# Patient Record
Sex: FEMALE | Race: BLACK OR AFRICAN AMERICAN
[De-identification: names, ages, dates, MRNs, and addresses within clinical notes are randomized per-mention and may not be internally consistent; named-entity substitution may affect disease eponyms.]

---

## 2020-11-01 ENCOUNTER — HOSPITAL ENCOUNTER (EMERGENCY)
Dept: HOSPITAL 5 - ED | Age: 40
Discharge: HOME | End: 2020-11-01
Payer: COMMERCIAL

## 2020-11-01 DIAGNOSIS — Z3A.01: ICD-10-CM

## 2020-11-01 DIAGNOSIS — O03.9: Primary | ICD-10-CM

## 2020-11-01 LAB
ALBUMIN SERPL-MCNC: 3.9 G/DL (ref 3.9–5)
ALT SERPL-CCNC: 30 UNITS/L (ref 7–56)
BASOPHILS # (AUTO): 0.1 K/MM3 (ref 0–0.1)
BASOPHILS NFR BLD AUTO: 0.8 % (ref 0–1.8)
BILIRUB UR QL STRIP: (no result)
BLOOD UR QL VISUAL: (no result)
BUN SERPL-MCNC: 14 MG/DL (ref 7–17)
BUN/CREAT SERPL: 18 %
CALCIUM SERPL-MCNC: 9 MG/DL (ref 8.4–10.2)
EOSINOPHIL # BLD AUTO: 0.2 K/MM3 (ref 0–0.4)
EOSINOPHIL NFR BLD AUTO: 2.8 % (ref 0–4.3)
HCT VFR BLD CALC: 38.1 % (ref 30.3–42.9)
HEMOLYSIS INDEX: 12
HGB BLD-MCNC: 12.9 GM/DL (ref 10.1–14.3)
LYMPHOCYTES # BLD AUTO: 1.6 K/MM3 (ref 1.2–5.4)
LYMPHOCYTES NFR BLD AUTO: 25 % (ref 13.4–35)
MCHC RBC AUTO-ENTMCNC: 34 % (ref 30–34)
MCV RBC AUTO: 99 FL (ref 79–97)
MONOCYTES # (AUTO): 0.5 K/MM3 (ref 0–0.8)
MONOCYTES % (AUTO): 7.4 % (ref 0–7.3)
MUCOUS THREADS #/AREA URNS HPF: (no result) /HPF
PH UR STRIP: 6 [PH] (ref 5–7)
PLATELET # BLD: 274 K/MM3 (ref 140–440)
PROT UR STRIP-MCNC: (no result) MG/DL
RBC # BLD AUTO: 3.84 M/MM3 (ref 3.65–5.03)
RBC #/AREA URNS HPF: < 1 /HPF (ref 0–6)
UROBILINOGEN UR-MCNC: < 2 MG/DL (ref ?–2)
WBC #/AREA URNS HPF: 5 /HPF (ref 0–6)

## 2020-11-01 PROCEDURE — 80053 COMPREHEN METABOLIC PANEL: CPT

## 2020-11-01 PROCEDURE — 81001 URINALYSIS AUTO W/SCOPE: CPT

## 2020-11-01 PROCEDURE — 76856 US EXAM PELVIC COMPLETE: CPT

## 2020-11-01 PROCEDURE — 85025 COMPLETE CBC W/AUTO DIFF WBC: CPT

## 2020-11-01 PROCEDURE — 83690 ASSAY OF LIPASE: CPT

## 2020-11-01 PROCEDURE — 84702 CHORIONIC GONADOTROPIN TEST: CPT

## 2020-11-01 PROCEDURE — 36415 COLL VENOUS BLD VENIPUNCTURE: CPT

## 2020-11-01 NOTE — EMERGENCY DEPARTMENT REPORT
Blank Doc





- Documentation


Documentation: 





40-year-old female that presents with abdominal pain.  Stated had a mischarge 6 

days ago.  Denies any vaginal bleeding








Translation phone line used for interruption.





This initial assessment/diagnostic orders/clinical plan/treatment(s) is/are 

subject to change based on patient's health status, clinical progression and re-

assessment by fellow clinical providers in the ED.  Further treatment and workup

at subsequent clinical providers discretion.  Patient/guardians urged not to 

elope from the ED as their condition may be serious if not clinically assessed 

and managed.  Initial orders include:


1- Patient sent to [facility] for further evaluation and treatment


2- labs


3- UA

## 2020-11-01 NOTE — ULTRASOUND REPORT
PELVIC ULTRASOUND



INDICATION: Pelvic pain, history of recent spontaneous 



COMPARISON: None



TECHNIQUE: Transabdominal



FINDINGS: Retroverted uterus is seen measuring 8.8 x 4.9 x 7.6 cm. Endometrial stripe is somewhat ill
-defined due to low detail but appears to measure 7 mm. No obvious uterine abnormalities are seen bes
t can be determined.



Right ovary measures 4 cm in length and shows no focal lesions. Left ovary measures 4.4 cm in length 
and shows no focal lesions. Flow is seen in both ovaries.



No free fluid is seen.



IMPRESSION: Somewhat limited study due to low detail but no obvious abnormalities are seen



Signer Name: Austin Allan MD 

Signed: 2020 10:01 PM

Workstation Name: [id number]

## 2020-11-01 NOTE — EMERGENCY DEPARTMENT REPORT
ED Female  HPI





- General


Chief complaint: Abdominal Pain


Stated complaint: PREG


Time Seen by Provider: 20 15:36


Source: patient


Mode of arrival: Ambulatory


Limitations: Language Barrier





- History of Present Illness


Initial comments: 





Patient is a 40-year-old  American female with no past medical history and 

who is a A1, and a 6 days s/p complete miscarriage 6 days ago and who 

presents to the ED with complaint of persistent pelvic pain, intermittent 

vaginal bleeding and generalized weakness for the last 6 days.  Patient also 

states that she would like to be evaluated to determine if all products of 

conception were expelled during the miscarriage 6 days ago.  Patient denies 

vaginal discharge, dysuria, urinary frequency and urgency, low back pain, fever,

chills, cough, chest pain, shortness of breath, nausea and vomiting or diarrhea,

sore throat, headache or back pain.


MD Complaint: vaginal bleeding, pelvic pain


-: Sudden, days(s) (6)


Location: suprapubic


Radiation: non-radiating


Severity: moderate


Severity scale (0 -10): 4


Quality: cramping, dull


Consistency: intermittent


Improves with: none


Worsens with: none


Are you Pregnant Now?: No (6 days s/p complete miscarriage)


Associated Symptoms: denies other symptoms, vaginal bleeding, abdominal pain, 

loss of appetite.  denies: vaginal discharge, nausea/vomiting, fever/chills, 

headaches, dysuria, hematuria, rash, seizure, shortness of breath, syncope, 

weakness, other





- Related Data


Sexually active: Yes


: 2


Para: 1


A: 1


                                  Previous Rx's











 Medication  Instructions  Recorded  Last Taken  Type


 


Ibuprofen [Motrin] 800 mg PO Q8HR PRN #30 tablet 20 Unknown Rx











                                    Allergies











Allergy/AdvReac Type Severity Reaction Status Date / Time


 


No Known Allergies Allergy   Unverified 20 15:20














ED Review of Systems


ROS: 


Stated complaint: PREG


Other details as noted in HPI





Constitutional: denies: chills, fever


Eyes: denies: eye pain, eye discharge, vision change


ENT: denies: ear pain, throat pain


Respiratory: denies: cough, shortness of breath, wheezing


Cardiovascular: denies: chest pain, palpitations


Endocrine: no symptoms reported


Gastrointestinal: abdominal pain (suprapubic).  denies: nausea, diarrhea


Genitourinary: abnormal menses (vaginal bleeding).  denies: urgency, dysuria, 

discharge


Musculoskeletal: denies: back pain, joint swelling, arthralgia


Skin: denies: rash, lesions


Neurological: denies: headache, weakness, paresthesias


Psychiatric: denies: anxiety, depression


Hematological/Lymphatic: denies: easy bleeding, easy bruising





ED Past Medical Hx





- Past Medical History


Previous Medical History?: No





- Surgical History


Past Surgical History?: No





- Social History


Smoking Status: Never Smoker


Substance Use Type: None





- Medications


Home Medications: 


                                Home Medications











 Medication  Instructions  Recorded  Confirmed  Last Taken  Type


 


Ibuprofen [Motrin] 800 mg PO Q8HR PRN #30 tablet 20  Unknown Rx














ED Physical Exam





- General


Limitations: Language Barrier


General appearance: alert, in no apparent distress





- Head


Head exam: Present: atraumatic, normocephalic, normal inspection





- Eye


Eye exam: Present: normal appearance, PERRL, EOMI


Pupils: Present: normal accommodation





- ENT


ENT exam: Present: normal exam, normal orophraynx, mucous membranes moist, TM's 

normal bilaterally, normal external ear exam





- Neck


Neck exam: Present: normal inspection, full ROM





- Respiratory


Respiratory exam: Present: normal lung sounds bilaterally.  Absent: respiratory 

distress, wheezes, rales, rhonchi, chest wall tenderness, accessory muscle use, 

decreased breath sounds





- Cardiovascular


Cardiovascular Exam: Present: regular rate, normal rhythm, normal heart sounds. 

 Absent: systolic murmur, diastolic murmur, rubs, gallop





- GI/Abdominal


GI/Abdominal exam: Present: soft, tenderness (Palpable mild suprapubic tendern

ess), normal bowel sounds.  Absent: guarding, rebound, hyperactive bowel sounds,

 hypoactive bowel sounds, organomegaly





- 


Bi-manual exam: Present: other (Pelvic exam deferred, patient declined)





- Extremities Exam


Extremities exam: Present: normal inspection, full ROM, normal capillary refill





- Back Exam


Back exam: Present: normal inspection, full ROM.  Absent: tenderness, CVA te

nderness (R), CVA tenderness (L), muscle spasm, paraspinal tenderness, vertebral

 tenderness





- Neurological Exam


Neurological exam: Present: alert, oriented X3, CN II-XII intact, normal gait, 

reflexes normal





- Psychiatric


Psychiatric exam: Present: normal affect, normal mood





- Skin


Skin exam: Present: warm, dry, intact, normal color.  Absent: rash





ED Course





                                   Vital Signs











  20





  15:20 20:33


 


Temperature 98.8 F 


 


Pulse Rate 66 


 


Respiratory 18 18





Rate  


 


Blood Pressure 152/68 














ED Medical Decision Making





- Lab Data


Result diagrams: 


                                 20 17:00





                                 20 17:00





- Radiology Data


Radiology results: report reviewed, image reviewed





Findings





[facility] 


[address] [place] [address] 





Ultrasound Report 


Signed 





 Patient: COY RASCON MR#: [id number] 


 313 


 : 1980 Acct:[id number] 





 Age/Sex: 40 / F ADM Date: 20 





 Loc: ED 


 Attending Dr: 








 Ordering Physician: JUDY RODRIGUEZ 


 Date of Service: 20 


 Procedure(s): US pelvic complete 


 Accession Number(s): [id number] 





 cc: JUDY RODRIGUEZ 








 PELVIC ULTRASOUND 





INDICATION: Pelvic pain, history of recent spontaneous  





COMPARISON: None 





TECHNIQUE: Transabdominal 





FINDINGS: Retroverted uterus is seen measuring 8.8 x 4.9 x 7.6 cm. Endometrial 

stripe is somewhat 


 ill-defined due to low detail but appears to measure 7 mm. No obvious uterine 

abnormalities are seen


 best can be determined. 





Right ovary measures 4 cm in length and shows no focal lesions. Left ovary 

measures 4.4 cm in 


 length and shows no focal lesions. Flow is seen in both ovaries. 





No free fluid is seen. 





IMPRESSION: Somewhat limited study due to low detail but no obvious 

abnormalities are seen 





Signer Name: Austin Allan MD 


Signed: 2020 10:01 PM 


Workstation Name: [id number] 








 Transcribed By:  


 Dictated By: Austin Allan MD 


 Electronically Authenticated By: Austin Allan MD 


 Signed Date/Time: 20 











 DD/DT: 20 


 TD/TT: 





- Medical Decision Making





This is a 40-year-old  American female with no past medical history and who

 is a A1, and a 6 days s/p complete miscarriage 6 days ago and who presents 

to the ED with complaint of persistent pelvic pain, intermittent vaginal 

bleeding and generalized weakness for the last 6 days.  Patient also states that

 she would like to be evaluated to determine if all products of conception were 

expelled during the miscarriage 6 days ago.  In the ED, patient is alert and 

oriented x3 and is not in any distress with normal vital signs.  Lab test 

results were reviewed and are all nonactionable except for hCG quant of 980.  

The patient was treated for pain in the ED and complete pelvic ultrasound showed

 no acute abnormalities and no IUP.  On reevaluation, patient's pain is well 

controlled medications.  Patient was discharged home on pain medications and 

advised to continue taking pain medication as needed, drink plenty of fluids and

 follow-up with your OB/GYN physician in 5 to 7 days for reevaluation or return 

to the ED immediately if symptoms get worse.





- Differential Diagnosis


Complete miscarriage; UTI; ovarian cyst; dysmenorrhea; fibroids


Critical care attestation.: 


If time is entered above; I have spent that time in minutes in the direct care 

of this critically ill patient, excluding procedure time.








ED Disposition


Clinical Impression: 


 Complete miscarriage, Vaginal bleeding, Dysmenorrhea





Disposition:  TO HOME OR SELFCARE


Is pt being admited?: No


Does the pt Need Aspirin: No


Condition: Stable


Instructions:  Abdominal Pain (ED), Spontaneous Miscarriage (ED)


Additional Instructions: 


All lab test results are unremarkable with no abnormalities.  The hCG quant is 

low.  The pelvic ultrasound showed no intrauterine pregnancy, and no other acute

 abnormalities.  Therefore maintain a complete pelvic rest, take medication with

 food, drink plenty of fluids and follow-up with your OB/GYN physician in 5 to 7

 days for reevaluation.  Return to the ED immediately if symptoms get worse.


Prescriptions: 


Ibuprofen [Motrin] 800 mg PO Q8HR PRN #30 tablet


 PRN Reason: Pain , Severe (7-10)


Referrals: 


TAYLOR JACINTO MD [Primary Care Provider] - 3-5 Days


Time of Disposition: 23:33


Print Language: ENGLISH

## 2020-11-02 VITALS — DIASTOLIC BLOOD PRESSURE: 64 MMHG | SYSTOLIC BLOOD PRESSURE: 140 MMHG
